# Patient Record
Sex: FEMALE | Race: WHITE | Employment: FULL TIME | URBAN - METROPOLITAN AREA
[De-identification: names, ages, dates, MRNs, and addresses within clinical notes are randomized per-mention and may not be internally consistent; named-entity substitution may affect disease eponyms.]

---

## 2022-07-03 RX ORDER — ESOMEPRAZOLE MAGNESIUM 40 MG/1
1 CAPSULE, DELAYED RELEASE ORAL
COMMUNITY

## 2022-07-03 RX ORDER — ALBUTEROL SULFATE 90 UG/1
AEROSOL, METERED RESPIRATORY (INHALATION)
COMMUNITY
Start: 2021-08-21

## 2024-03-21 ENCOUNTER — HOSPITAL ENCOUNTER (EMERGENCY)
Age: 34
Discharge: HOME | End: 2024-03-21

## 2024-03-21 ENCOUNTER — APPOINTMENT (OUTPATIENT)
Dept: CT IMAGING | Age: 34
End: 2024-03-21

## 2024-03-21 VITALS
OXYGEN SATURATION: 98 % | HEIGHT: 67 IN | RESPIRATION RATE: 18 BRPM | SYSTOLIC BLOOD PRESSURE: 123 MMHG | BODY MASS INDEX: 36.1 KG/M2 | WEIGHT: 230 LBS | DIASTOLIC BLOOD PRESSURE: 66 MMHG | HEART RATE: 65 BPM | TEMPERATURE: 98.4 F

## 2024-03-21 DIAGNOSIS — K11.20 PAROTITIS: Primary | ICD-10-CM

## 2024-03-21 DIAGNOSIS — J01.00 ACUTE NON-RECURRENT MAXILLARY SINUSITIS: ICD-10-CM

## 2024-03-21 LAB
ALBUMIN SERPL-MCNC: 4 G/DL (ref 3.5–5)
ALBUMIN/GLOB SERPL: 1 (ref 0.4–1.6)
ALP SERPL-CCNC: 49 U/L (ref 50–130)
ALT SERPL-CCNC: 24 U/L (ref 12–65)
ANION GAP SERPL CALC-SCNC: 7 MMOL/L (ref 2–11)
AST SERPL-CCNC: 16 U/L (ref 15–37)
BASOPHILS # BLD: 0.1 K/UL (ref 0–0.2)
BASOPHILS NFR BLD: 1 % (ref 0–2)
BILIRUB SERPL-MCNC: 0.4 MG/DL (ref 0.2–1.1)
BILIRUB UR QL: NEGATIVE
BUN SERPL-MCNC: 17 MG/DL (ref 6–23)
CALCIUM SERPL-MCNC: 9.6 MG/DL (ref 8.3–10.4)
CHLORIDE SERPL-SCNC: 105 MMOL/L (ref 103–113)
CO2 SERPL-SCNC: 26 MMOL/L (ref 21–32)
CREAT SERPL-MCNC: 0.85 MG/DL (ref 0.6–1)
DIFFERENTIAL METHOD BLD: ABNORMAL
EOSINOPHIL # BLD: 0.3 K/UL (ref 0–0.8)
EOSINOPHIL NFR BLD: 3 % (ref 0.5–7.8)
ERYTHROCYTE [DISTWIDTH] IN BLOOD BY AUTOMATED COUNT: 14.3 % (ref 11.9–14.6)
GLOBULIN SER CALC-MCNC: 3.9 G/DL (ref 2.8–4.5)
GLUCOSE SERPL-MCNC: 96 MG/DL (ref 65–100)
GLUCOSE UR QL STRIP.AUTO: NEGATIVE MG/DL
HCT VFR BLD AUTO: 38.2 % (ref 35.8–46.3)
HGB BLD-MCNC: 12.3 G/DL (ref 11.7–15.4)
IMM GRANULOCYTES # BLD AUTO: 0 K/UL (ref 0–0.5)
IMM GRANULOCYTES NFR BLD AUTO: 0 % (ref 0–5)
KETONES UR-MCNC: NEGATIVE MG/DL
LEUKOCYTE ESTERASE UR QL STRIP: NEGATIVE
LYMPHOCYTES # BLD: 2.3 K/UL (ref 0.5–4.6)
LYMPHOCYTES NFR BLD: 24 % (ref 13–44)
MCH RBC QN AUTO: 26.2 PG (ref 26.1–32.9)
MCHC RBC AUTO-ENTMCNC: 32.2 G/DL (ref 31.4–35)
MCV RBC AUTO: 81.3 FL (ref 82–102)
MONOCYTES # BLD: 0.6 K/UL (ref 0.1–1.3)
MONOCYTES NFR BLD: 6 % (ref 4–12)
NEUTS SEG # BLD: 6.1 K/UL (ref 1.7–8.2)
NEUTS SEG NFR BLD: 66 % (ref 43–78)
NITRITE UR QL: NEGATIVE
NRBC # BLD: 0 K/UL (ref 0–0.2)
PH UR: 6 (ref 5–9)
PLATELET # BLD AUTO: 314 K/UL (ref 150–450)
PMV BLD AUTO: 8.7 FL (ref 9.4–12.3)
POTASSIUM SERPL-SCNC: 3.8 MMOL/L (ref 3.5–5.1)
PROT SERPL-MCNC: 7.9 G/DL (ref 6.3–8.2)
PROT UR QL: NEGATIVE MG/DL
RBC # BLD AUTO: 4.7 M/UL (ref 4.05–5.2)
RBC # UR STRIP: NEGATIVE
SERVICE CMNT-IMP: ABNORMAL
SODIUM SERPL-SCNC: 138 MMOL/L (ref 136–146)
SP GR UR: 1.02 (ref 1–1.02)
UROBILINOGEN UR QL: 0.2 EU/DL (ref 0.2–1)
WBC # BLD AUTO: 9.3 K/UL (ref 4.3–11.1)

## 2024-03-21 PROCEDURE — 6360000004 HC RX CONTRAST MEDICATION

## 2024-03-21 PROCEDURE — 70491 CT SOFT TISSUE NECK W/DYE: CPT

## 2024-03-21 PROCEDURE — 96374 THER/PROPH/DIAG INJ IV PUSH: CPT

## 2024-03-21 PROCEDURE — 99285 EMERGENCY DEPT VISIT HI MDM: CPT

## 2024-03-21 PROCEDURE — 6360000002 HC RX W HCPCS

## 2024-03-21 PROCEDURE — 2580000003 HC RX 258

## 2024-03-21 PROCEDURE — 81003 URINALYSIS AUTO W/O SCOPE: CPT

## 2024-03-21 PROCEDURE — 80053 COMPREHEN METABOLIC PANEL: CPT

## 2024-03-21 PROCEDURE — 85025 COMPLETE CBC W/AUTO DIFF WBC: CPT

## 2024-03-21 RX ORDER — KETOROLAC TROMETHAMINE 30 MG/ML
30 INJECTION, SOLUTION INTRAMUSCULAR; INTRAVENOUS
Status: COMPLETED | OUTPATIENT
Start: 2024-03-21 | End: 2024-03-21

## 2024-03-21 RX ORDER — AMOXICILLIN AND CLAVULANATE POTASSIUM 875; 125 MG/1; MG/1
1 TABLET, FILM COATED ORAL 2 TIMES DAILY
Qty: 14 TABLET | Refills: 0 | Status: SHIPPED | OUTPATIENT
Start: 2024-03-21 | End: 2024-03-28

## 2024-03-21 RX ORDER — OMEPRAZOLE 40 MG/1
40 CAPSULE, DELAYED RELEASE ORAL
COMMUNITY
Start: 2020-03-26

## 2024-03-21 RX ORDER — 0.9 % SODIUM CHLORIDE 0.9 %
1000 INTRAVENOUS SOLUTION INTRAVENOUS
Status: COMPLETED | OUTPATIENT
Start: 2024-03-21 | End: 2024-03-21

## 2024-03-21 RX ORDER — MONTELUKAST SODIUM 10 MG/1
10 TABLET ORAL DAILY
COMMUNITY
Start: 2024-02-07

## 2024-03-21 RX ADMIN — SODIUM CHLORIDE 1000 ML: 9 INJECTION, SOLUTION INTRAVENOUS at 19:09

## 2024-03-21 RX ADMIN — IOPAMIDOL 100 ML: 755 INJECTION, SOLUTION INTRAVENOUS at 19:43

## 2024-03-21 RX ADMIN — KETOROLAC TROMETHAMINE 30 MG: 30 INJECTION, SOLUTION INTRAMUSCULAR at 19:05

## 2024-03-21 ASSESSMENT — PAIN SCALES - GENERAL
PAINLEVEL_OUTOF10: 9
PAINLEVEL_OUTOF10: 5

## 2024-03-21 ASSESSMENT — ENCOUNTER SYMPTOMS
SINUS PRESSURE: 1
VOICE CHANGE: 0
SINUS PAIN: 1
TROUBLE SWALLOWING: 0
FACIAL SWELLING: 1
ABDOMINAL PAIN: 0
VOMITING: 1
NAUSEA: 1

## 2024-03-21 ASSESSMENT — PAIN - FUNCTIONAL ASSESSMENT: PAIN_FUNCTIONAL_ASSESSMENT: 0-10
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
## 2024-03-22 NOTE — DISCHARGE INSTRUCTIONS
Please take all the antibiotics as prescribed even if you are feeling better.  Please start sucking on sour hard candies such as lemon drops and war heads that are very sour in order to stimulate salivation.  Follow-up with ENT for close reevaluation.  Use over-the-counter pain medication as needed to help with your symptoms.  If your symptoms change or worsen, return immediately to the emergency department.

## 2024-03-22 NOTE — ED NOTES
I have reviewed discharge instructions with the patient.  The patient verbalized understanding.    Patient left ED via Discharge Method: ambulatory to Home with (insert name of family/friend, self, transport).    Opportunity for questions and clarification provided.       Patient given 1 scripts.         To continue your aftercare when you leave the hospital, you may receive an automated call from our care team to check in on how you are doing.  This is a free service and part of our promise to provide the best care and service to meet your aftercare needs.” If you have questions, or wish to unsubscribe from this service please call [phone].  Thank you for Choosing our [facility] Emergency Department.

## 2024-04-04 ENCOUNTER — OFFICE VISIT (OUTPATIENT)
Dept: ENT CLINIC | Age: 34
End: 2024-04-04

## 2024-04-04 VITALS — BODY MASS INDEX: 36.96 KG/M2 | RESPIRATION RATE: 18 BRPM | WEIGHT: 230 LBS | HEIGHT: 66 IN

## 2024-04-04 DIAGNOSIS — K11.20 PAROTITIS: Primary | ICD-10-CM

## 2024-04-04 PROCEDURE — 99203 OFFICE O/P NEW LOW 30 MIN: CPT | Performed by: OTOLARYNGOLOGY

## 2024-04-04 ASSESSMENT — ENCOUNTER SYMPTOMS
SORE THROAT: 0
SHORTNESS OF BREATH: 0
ABDOMINAL PAIN: 0

## 2024-04-04 NOTE — PROGRESS NOTES
Normal.  - THYROID: No significant mass.     - SINUSES: Air-fluid level in the right maxillary sinus. Mild mucosal disease in  the ethmoid air cells as well as left maxillary sinus  - BONES: No bone lesions.  - LUNG APICES: Clear.  - OTHER: No additional findings.     IMPRESSION:  Inflammatory changes involving the left parotid gland most likely secondary to  parotitis . Although, given history of trauma to this area sequela of contusion  injury could give a similar.      Findings which can be seen with acute right maxillary sinusitis    ASSESSMENT and PLAN      ICD-10-CM    1. Parotitis  K11.20         She has cleared her recent episode of left parotitis, and I did not palpate any masses or stones on my exam today.  She will use sialagogues, warm compresses, and massage if her symptoms recur.  RTC as needed.    Nas Kilpatrick MD  4/4/2024    Electronically signed by Nas Kilpatrick MD on 4/4/2024 at 10:54 AM

## 2024-09-23 ENCOUNTER — HOSPITAL ENCOUNTER (EMERGENCY)
Age: 34
Discharge: HOME | End: 2024-09-23
Attending: STUDENT IN AN ORGANIZED HEALTH CARE EDUCATION/TRAINING PROGRAM
Payer: COMMERCIAL

## 2024-09-23 VITALS
WEIGHT: 230 LBS | HEIGHT: 66 IN | DIASTOLIC BLOOD PRESSURE: 62 MMHG | RESPIRATION RATE: 16 BRPM | TEMPERATURE: 97.6 F | BODY MASS INDEX: 36.96 KG/M2 | OXYGEN SATURATION: 100 % | SYSTOLIC BLOOD PRESSURE: 127 MMHG | HEART RATE: 72 BPM

## 2024-09-23 DIAGNOSIS — R04.0 EPISTAXIS: Primary | ICD-10-CM

## 2024-09-23 DIAGNOSIS — R51.9 ACUTE NONINTRACTABLE HEADACHE, UNSPECIFIED HEADACHE TYPE: ICD-10-CM

## 2024-09-23 LAB
ALBUMIN SERPL-MCNC: 3.5 G/DL (ref 3.5–5)
ALBUMIN/GLOB SERPL: 1.2 (ref 1–1.9)
ALP SERPL-CCNC: 52 U/L (ref 35–104)
ALT SERPL-CCNC: 35 U/L (ref 12–65)
ANION GAP SERPL CALC-SCNC: 9 MMOL/L (ref 9–18)
AST SERPL-CCNC: 28 U/L (ref 15–37)
BASOPHILS # BLD: 0.1 K/UL (ref 0–0.2)
BASOPHILS NFR BLD: 1 % (ref 0–2)
BILIRUB SERPL-MCNC: <0.2 MG/DL (ref 0–1.2)
BUN SERPL-MCNC: 13 MG/DL (ref 6–23)
CALCIUM SERPL-MCNC: 9.2 MG/DL (ref 8.8–10.2)
CHLORIDE SERPL-SCNC: 105 MMOL/L (ref 98–107)
CO2 SERPL-SCNC: 26 MMOL/L (ref 20–28)
CREAT SERPL-MCNC: 0.73 MG/DL (ref 0.6–1.1)
DIFFERENTIAL METHOD BLD: ABNORMAL
EOSINOPHIL # BLD: 0.3 K/UL (ref 0–0.8)
EOSINOPHIL NFR BLD: 4 % (ref 0.5–7.8)
ERYTHROCYTE [DISTWIDTH] IN BLOOD BY AUTOMATED COUNT: 14.6 % (ref 11.9–14.6)
GLOBULIN SER CALC-MCNC: 2.9 G/DL (ref 2.3–3.5)
GLUCOSE SERPL-MCNC: 91 MG/DL (ref 70–99)
HCT VFR BLD AUTO: 35.4 % (ref 35.8–46.3)
HGB BLD-MCNC: 10.9 G/DL (ref 11.7–15.4)
IMM GRANULOCYTES # BLD AUTO: 0 K/UL (ref 0–0.5)
IMM GRANULOCYTES NFR BLD AUTO: 0 % (ref 0–5)
LYMPHOCYTES # BLD: 2.1 K/UL (ref 0.5–4.6)
LYMPHOCYTES NFR BLD: 25 % (ref 13–44)
MCH RBC QN AUTO: 25.3 PG (ref 26.1–32.9)
MCHC RBC AUTO-ENTMCNC: 30.8 G/DL (ref 31.4–35)
MCV RBC AUTO: 82.3 FL (ref 82–102)
MONOCYTES # BLD: 0.7 K/UL (ref 0.1–1.3)
MONOCYTES NFR BLD: 8 % (ref 4–12)
NEUTS SEG # BLD: 5.2 K/UL (ref 1.7–8.2)
NEUTS SEG NFR BLD: 62 % (ref 43–78)
NRBC # BLD: 0 K/UL (ref 0–0.2)
PLATELET # BLD AUTO: 252 K/UL (ref 150–450)
PMV BLD AUTO: 9 FL (ref 9.4–12.3)
POTASSIUM SERPL-SCNC: 4.2 MMOL/L (ref 3.5–5.1)
PROT SERPL-MCNC: 6.5 G/DL (ref 6.3–8.2)
RBC # BLD AUTO: 4.3 M/UL (ref 4.05–5.2)
SARS-COV-2 RDRP RESP QL NAA+PROBE: NOT DETECTED
SODIUM SERPL-SCNC: 140 MMOL/L (ref 136–145)
SOURCE: NORMAL
WBC # BLD AUTO: 8.4 K/UL (ref 4.3–11.1)

## 2024-09-23 PROCEDURE — 99283 EMERGENCY DEPT VISIT LOW MDM: CPT

## 2024-09-23 PROCEDURE — 80053 COMPREHEN METABOLIC PANEL: CPT

## 2024-09-23 PROCEDURE — 87635 SARS-COV-2 COVID-19 AMP PRB: CPT

## 2024-09-23 PROCEDURE — 85025 COMPLETE CBC W/AUTO DIFF WBC: CPT

## 2024-09-23 RX ORDER — ACETAMINOPHEN 325 MG/1
650 TABLET ORAL
Status: DISCONTINUED | OUTPATIENT
Start: 2024-09-23 | End: 2024-09-23

## 2024-09-23 RX ORDER — SODIUM CHLORIDE, SODIUM LACTATE, POTASSIUM CHLORIDE, AND CALCIUM CHLORIDE .6; .31; .03; .02 G/100ML; G/100ML; G/100ML; G/100ML
1000 INJECTION, SOLUTION INTRAVENOUS
Status: DISCONTINUED | OUTPATIENT
Start: 2024-09-23 | End: 2024-09-23

## 2024-09-23 RX ORDER — METOCLOPRAMIDE HYDROCHLORIDE 5 MG/ML
10 INJECTION INTRAMUSCULAR; INTRAVENOUS ONCE
Status: DISCONTINUED | OUTPATIENT
Start: 2024-09-23 | End: 2024-09-23